# Patient Record
Sex: MALE | Race: WHITE | NOT HISPANIC OR LATINO | URBAN - METROPOLITAN AREA
[De-identification: names, ages, dates, MRNs, and addresses within clinical notes are randomized per-mention and may not be internally consistent; named-entity substitution may affect disease eponyms.]

---

## 2024-02-28 ENCOUNTER — LAB (OUTPATIENT)
Dept: URBAN - METROPOLITAN AREA CLINIC 7 | Facility: CLINIC | Age: 64
End: 2024-02-28

## 2024-02-28 ENCOUNTER — OV NP (OUTPATIENT)
Dept: URBAN - METROPOLITAN AREA CLINIC 7 | Facility: CLINIC | Age: 64
End: 2024-02-28
Payer: MEDICARE

## 2024-02-28 VITALS
SYSTOLIC BLOOD PRESSURE: 114 MMHG | HEIGHT: 72 IN | DIASTOLIC BLOOD PRESSURE: 78 MMHG | WEIGHT: 196 LBS | BODY MASS INDEX: 26.55 KG/M2

## 2024-02-28 DIAGNOSIS — B18.2 CHRONIC HEPATITIS C WITHOUT HEPATIC COMA: ICD-10-CM

## 2024-02-28 DIAGNOSIS — Z12.11 COLON CANCER SCREENING: ICD-10-CM

## 2024-02-28 PROBLEM — 305058001: Status: ACTIVE | Noted: 2024-02-28

## 2024-02-28 PROBLEM — 128302006: Status: ACTIVE | Noted: 2024-02-28

## 2024-02-28 PROCEDURE — 91200 LIVER ELASTOGRAPHY: CPT | Performed by: INTERNAL MEDICINE

## 2024-02-28 PROCEDURE — 99204 OFFICE O/P NEW MOD 45 MIN: CPT | Performed by: INTERNAL MEDICINE

## 2024-02-28 RX ORDER — LISINOPRIL AND HYDROCHLOROTHIAZIDE 12.5; 1 MG/1; MG/1
TABLET ORAL
Qty: 90 TABLET | Status: ACTIVE | COMMUNITY

## 2024-02-28 RX ORDER — ATENOLOL 50 MG/1
TABLET ORAL
Qty: 90 TABLET | Status: ACTIVE | COMMUNITY

## 2024-02-28 RX ORDER — NALOXONE HYDROCHLORIDE 4 MG/.1ML
SPRAY NASAL
Qty: 2 EACH | Status: ON HOLD | COMMUNITY

## 2024-02-28 RX ORDER — OXYCODONE HYDROCHLORIDE 30 MG/1
TAKE 1 TABLET BY MOUTH EVERY 5 HOURS AS NEEDED FOR 30 DAYS TABLET ORAL
Qty: 150 EACH | Refills: 0 | Status: ACTIVE | COMMUNITY

## 2024-02-28 NOTE — HPI-TODAY'S VISIT:
Chronic HCV diagnosed aprox 10 years ago. Treatment naive  No known cirrhosis HCV RNA [id number] HBV negative. No known risk factors for chronic viral hepatitis with no  tattoos  no prior blood transfusions and no history IV drug use . No significant alcohol use history. No prior vaccination for HAV,HBV. No family history of liver disease. No prior liver biopsy. No fevers or chills. No night sweats. No weight loss. No nausea or vomiting. No travel,sick contacts or new medications. No family history IBD,CRC.

## 2024-05-14 ENCOUNTER — LAB OUTSIDE AN ENCOUNTER (OUTPATIENT)
Dept: URBAN - METROPOLITAN AREA CLINIC 7 | Facility: CLINIC | Age: 64
End: 2024-05-14

## 2024-05-15 LAB
A/G RATIO: 1.5
ABSOLUTE BASOPHILS: 0
ABSOLUTE EOSINOPHILS: 88
ABSOLUTE LYMPHOCYTES: 1100
ABSOLUTE MONOCYTES: 352
ABSOLUTE NEUTROPHILS: 2860
ALBUMIN: 4.2
ALKALINE PHOSPHATASE: 70
ALT (SGPT): 11
AST (SGOT): 17
BASOPHILS: 0
BILIRUBIN, TOTAL: 0.7
BUN/CREATININE RATIO: (no result)
BUN: 12
CALCIUM: 9.2
CARBON DIOXIDE, TOTAL: 30
CHLORIDE: 105
CREATININE: 0.92
EGFR: 92
EOSINOPHILS: 2
GLOBULIN, TOTAL: 2.8
GLUCOSE: 103
HCV RNA, QUANTITATIVE REAL TIME PCR: 1.24
HCV RNA, QUANTITATIVE REAL TIME PCR: 18
HEMATOCRIT: 42.9
HEMOGLOBIN: 14.4
LYMPHOCYTES: 25
MCH: 31.5
MCHC: 33.6
MCV: 93.9
MONOCYTES: 8
MPV: 11.5
NEUTROPHILS: 65
PLATELET COUNT: 119
POTASSIUM: 4.4
PROTEIN, TOTAL: 7
RDW: 12.6
RED BLOOD CELL COUNT: 4.57
SODIUM: 143
WHITE BLOOD CELL COUNT: 4.4

## 2024-05-29 ENCOUNTER — TELEPHONE ENCOUNTER (OUTPATIENT)
Dept: URBAN - METROPOLITAN AREA CLINIC 7 | Facility: CLINIC | Age: 64
End: 2024-05-29

## 2024-05-29 ENCOUNTER — OFFICE VISIT (OUTPATIENT)
Dept: URBAN - METROPOLITAN AREA CLINIC 7 | Facility: CLINIC | Age: 64
End: 2024-05-29
Payer: MEDICARE

## 2024-05-29 ENCOUNTER — DASHBOARD ENCOUNTERS (OUTPATIENT)
Age: 64
End: 2024-05-29

## 2024-05-29 VITALS
HEIGHT: 72 IN | BODY MASS INDEX: 26.82 KG/M2 | TEMPERATURE: 97.9 F | DIASTOLIC BLOOD PRESSURE: 82 MMHG | WEIGHT: 198 LBS | SYSTOLIC BLOOD PRESSURE: 138 MMHG

## 2024-05-29 DIAGNOSIS — B18.2 CHRONIC HEPATITIS C WITHOUT HEPATIC COMA: ICD-10-CM

## 2024-05-29 PROCEDURE — 99214 OFFICE O/P EST MOD 30 MIN: CPT | Performed by: INTERNAL MEDICINE

## 2024-05-29 RX ORDER — NALOXONE HYDROCHLORIDE 4 MG/.1ML
SPRAY NASAL
Qty: 2 EACH | Status: DISCONTINUED | COMMUNITY

## 2024-05-29 RX ORDER — LISINOPRIL AND HYDROCHLOROTHIAZIDE 12.5; 1 MG/1; MG/1
TABLET ORAL
Qty: 90 TABLET | Status: ACTIVE | COMMUNITY

## 2024-05-29 RX ORDER — OXYCODONE HYDROCHLORIDE 30 MG/1
TAKE 1 TABLET BY MOUTH EVERY 5 HOURS AS NEEDED FOR 30 DAYS TABLET ORAL
Qty: 150 EACH | Refills: 0 | Status: ACTIVE | COMMUNITY

## 2024-05-29 RX ORDER — ATENOLOL 50 MG/1
TABLET ORAL
Qty: 90 TABLET | Status: ACTIVE | COMMUNITY

## 2024-06-16 ENCOUNTER — LAB OUTSIDE AN ENCOUNTER (OUTPATIENT)
Dept: URBAN - METROPOLITAN AREA CLINIC 7 | Facility: CLINIC | Age: 64
End: 2024-06-16

## 2024-07-10 ENCOUNTER — TELEPHONE ENCOUNTER (OUTPATIENT)
Dept: URBAN - METROPOLITAN AREA CLINIC 8 | Facility: CLINIC | Age: 64
End: 2024-07-10

## 2024-07-26 ENCOUNTER — TELEPHONE ENCOUNTER (OUTPATIENT)
Dept: URBAN - METROPOLITAN AREA CLINIC 7 | Facility: CLINIC | Age: 64
End: 2024-07-26

## 2024-11-13 ENCOUNTER — OFFICE VISIT (OUTPATIENT)
Dept: URBAN - METROPOLITAN AREA CLINIC 7 | Facility: CLINIC | Age: 64
End: 2024-11-13

## 2024-11-13 ENCOUNTER — TELEPHONE ENCOUNTER (OUTPATIENT)
Dept: URBAN - METROPOLITAN AREA CLINIC 7 | Facility: CLINIC | Age: 64
End: 2024-11-13